# Patient Record
Sex: FEMALE | URBAN - METROPOLITAN AREA
[De-identification: names, ages, dates, MRNs, and addresses within clinical notes are randomized per-mention and may not be internally consistent; named-entity substitution may affect disease eponyms.]

---

## 2023-04-25 ENCOUNTER — OFFICE VISIT (OUTPATIENT)
Facility: LOCATION | Age: 28
End: 2023-04-25
Payer: MEDICAID

## 2023-04-25 DIAGNOSIS — H53.10 SUBJECTIVE VISUAL DISTURBANCE: ICD-10-CM

## 2023-04-25 DIAGNOSIS — H11.152 PINGUECULA, LEFT EYE: Primary | ICD-10-CM

## 2023-04-25 PROCEDURE — 92004 COMPRE OPH EXAM NEW PT 1/>: CPT | Performed by: OPTOMETRIST

## 2023-04-25 ASSESSMENT — INTRAOCULAR PRESSURE
OS: 21
OD: 17
OD: 20
OS: 23

## 2023-04-25 ASSESSMENT — KERATOMETRY
OD: 43.88
OS: 44.00

## 2023-04-25 ASSESSMENT — VISUAL ACUITY
OS: 20/20
OD: 20/20

## 2023-04-25 NOTE — IMPRESSION/PLAN
Impression: Subjective visual disturbance: H53.10. Plan: Patient reports history of migraines and has a consult with Neurologist this week. Patient to follow up in 1-2 weeks for dilated exam, 302 HVF and RNFL-OCT.

## 2023-04-25 NOTE — IMPRESSION/PLAN
Impression: Pinguecula, left eye: H11.152. Plan: Patient advised of all ocular findings. Discussed the importance of frequent lubrication and use of UV protection when outside or in the car. Monitor.

## 2023-05-19 ENCOUNTER — OFFICE VISIT (OUTPATIENT)
Facility: LOCATION | Age: 28
End: 2023-05-19
Payer: MEDICAID

## 2023-05-19 DIAGNOSIS — R51.9 HEADACHE: Primary | ICD-10-CM

## 2023-05-19 PROCEDURE — 92083 EXTENDED VISUAL FIELD XM: CPT | Performed by: OPTOMETRIST

## 2023-05-19 PROCEDURE — 99213 OFFICE O/P EST LOW 20 MIN: CPT | Performed by: OPTOMETRIST

## 2023-05-19 PROCEDURE — 92133 CPTRZD OPH DX IMG PST SGM ON: CPT | Performed by: OPTOMETRIST

## 2023-05-19 ASSESSMENT — INTRAOCULAR PRESSURE
OD: 13
OS: 14

## 2023-05-19 ASSESSMENT — KERATOMETRY
OD: 44.00
OS: 44.38

## 2023-05-19 ASSESSMENT — VISUAL ACUITY
OD: 20/20
OS: 20/20

## 2023-05-19 NOTE — IMPRESSION/PLAN
Impression: Headache: R51.9. Plan: All ocular structures appear healthy. VF 30-2 and RNFL performed and evaluated at today's examination- No abnormal findings OU. IOP within appropriate range. No ocular cause noted during today's exam. Advised patient to continue care with PCP and neurologist. Yeison Valenzuela patient to notify office with increased symptoms or vision loss.